# Patient Record
Sex: FEMALE | Race: WHITE | URBAN - NONMETROPOLITAN AREA
[De-identification: names, ages, dates, MRNs, and addresses within clinical notes are randomized per-mention and may not be internally consistent; named-entity substitution may affect disease eponyms.]

---

## 2021-01-01 ENCOUNTER — APPOINTMENT (RX ONLY)
Dept: URBAN - NONMETROPOLITAN AREA CLINIC 1 | Facility: CLINIC | Age: 71
Setting detail: DERMATOLOGY
End: 2021-01-01

## 2021-01-01 DIAGNOSIS — Z41.9 ENCOUNTER FOR PROCEDURE FOR PURPOSES OTHER THAN REMEDYING HEALTH STATE, UNSPECIFIED: ICD-10-CM

## 2021-01-01 PROCEDURE — ? JUVEDERM ULTRA PLUS XC INJECTION

## 2021-01-01 PROCEDURE — ? JUVEDERM ULTRA XC INJECTION

## 2021-01-01 PROCEDURE — ? RESTYLANE SILK INJECTION

## 2021-11-09 PROBLEM — Z41.9 ENCOUNTER FOR PROCEDURE FOR PURPOSES OTHER THAN REMEDYING HEALTH STATE, UNSPECIFIED: Status: ACTIVE | Noted: 2021-01-01

## 2021-11-09 NOTE — PROCEDURE: RESTYLANE SILK INJECTION
Price (Use Numbers Only, No Special Characters Or $): 815 Price (Use Numbers Only, No Special Characters Or $): 747

## 2021-11-09 NOTE — PROCEDURE: JUVEDERM ULTRA PLUS XC INJECTION
Price (Use Numbers Only, No Special Characters Or $): 428 Price (Use Numbers Only, No Special Characters Or $): 710

## 2021-11-09 NOTE — PROCEDURE: JUVEDERM ULTRA XC INJECTION
Opening Shift Note

Assumed care of patient, awake and alert.  No S/S of distress/SOB or pain.  Instructed on 
POC and to call for assist PRN, will continue to monitor for changes Q1hr and PRN. Price (Use Numbers Only, No Special Characters Or $): 287 Price (Use Numbers Only, No Special Characters Or $): 465